# Patient Record
Sex: FEMALE | Race: WHITE
[De-identification: names, ages, dates, MRNs, and addresses within clinical notes are randomized per-mention and may not be internally consistent; named-entity substitution may affect disease eponyms.]

---

## 2022-07-07 ENCOUNTER — APPOINTMENT (OUTPATIENT)
Dept: ORTHOPEDIC SURGERY | Facility: CLINIC | Age: 57
End: 2022-07-07

## 2022-07-07 VITALS — HEIGHT: 63 IN | WEIGHT: 175 LBS | BODY MASS INDEX: 31.01 KG/M2

## 2022-07-07 DIAGNOSIS — E78.00 PURE HYPERCHOLESTEROLEMIA, UNSPECIFIED: ICD-10-CM

## 2022-07-07 DIAGNOSIS — Z78.9 OTHER SPECIFIED HEALTH STATUS: ICD-10-CM

## 2022-07-07 DIAGNOSIS — J45.909 UNSPECIFIED ASTHMA, UNCOMPLICATED: ICD-10-CM

## 2022-07-07 DIAGNOSIS — I10 ESSENTIAL (PRIMARY) HYPERTENSION: ICD-10-CM

## 2022-07-07 DIAGNOSIS — Z80.9 FAMILY HISTORY OF MALIGNANT NEOPLASM, UNSPECIFIED: ICD-10-CM

## 2022-07-07 DIAGNOSIS — E11.9 TYPE 2 DIABETES MELLITUS W/OUT COMPLICATIONS: ICD-10-CM

## 2022-07-07 DIAGNOSIS — Z82.49 FAMILY HISTORY OF ISCHEMIC HEART DISEASE AND OTHER DISEASES OF THE CIRCULATORY SYSTEM: ICD-10-CM

## 2022-07-07 PROCEDURE — 73564 X-RAY EXAM KNEE 4 OR MORE: CPT | Mod: LT

## 2022-07-07 PROCEDURE — 73030 X-RAY EXAM OF SHOULDER: CPT | Mod: RT

## 2022-07-07 PROCEDURE — 99205 OFFICE O/P NEW HI 60 MIN: CPT

## 2022-07-07 PROCEDURE — L3670: CPT

## 2022-07-07 PROCEDURE — L1833: CPT

## 2022-07-08 PROBLEM — Z80.9 FAMILY HISTORY OF MALIGNANT NEOPLASM: Status: ACTIVE | Noted: 2022-07-07

## 2022-07-08 PROBLEM — Z82.49 FAMILY HISTORY OF CARDIAC DISORDER: Status: ACTIVE | Noted: 2022-07-07

## 2022-07-08 PROBLEM — Z78.9 NON-SMOKER: Status: ACTIVE | Noted: 2022-07-07

## 2022-07-08 NOTE — IMAGING
[Right] : right shoulder [Left] : left knee [All Views] : anteroposterior, lateral, skyline, and anteroposterior standing [Fracture] : Fracture [de-identified] : The patient is a well appearing 56 year old F of their stated age.\par Neck is supple & nontender to palpation. Negative Spurling's test.\par \par Effected Shoulder:  right \par Inspection:\par Scapula Winging: Negative\par Deformity: None\par Erythema: None\par Ecchymosis: None\par Abrasions: None\par Effusion: None\par \par Range of Motion:\par Active Forward Flexion: 180 degrees \par Active Abduction: 180 degrees \par Passive Forward Flexion: 180 degrees \par Passive Abduction: 180 degrees \par ER @ 90 degrees: 90 degrees\par IR @ 90 degrees: 45 degrees\par ER @ 0 degrees: 50 degrees\par \par Motor Exam:\par Forward Flexion: 5 out of 5\par Flexion Plane of Scapula: 5 out of 5\par Abduction: 5 out of 5\par Internal Rotation: 5 out of 5\par External Rotation: 5 out of 5\par Distal Motor Strength: 5 out of 5\par Stability Testing:\par Anterior: 1+\par Posterior: 1+\par Sulcus N: 1+\par Sulcus ER: 1+\par \par Provocative Tests:\par Drop Arm: Negative\par Impingement: Negative\par York: Negative\par X-Arm Adduction: Negative\par Belly Press: Negative\par Bear Hug: Negative\par Lift Off: Negative\par Apprehension: Negative\par Relocation: Negative\par Posterior Load & Shift: Negative\par Palpation:\par AC Joint: Nontender\par Clavicle: Nontender\par SC Joint: Nontender\par Bicepital Groove: Nontender\par Coracoid Process: Nontender\par Pectoralis Minor Tendon: Nontender\par Pectoralis Major Tendon: Nontender & palpably intact\par Latissimus Dorsi: Nontender \par Proximal Humerus: Nontender\par Scapula Body: Nontender\par Medial Scapula Boarder: Nontender\par Scapula Spine: Nontender\par \par Neurologic Exam: Sensation to Light Touch:\par Axillary: Grossly intact\par Ulnar: Grossly intact\par Radial: Grossly intact\par Median: Grossly intact\par Other:  N/A\par Circulatory/Pulses:\par Ulnar: 2+\par Radial: 2+\par Other Pertinent Findings: None\par \par Contralateral Shoulder\par Range of Motion:\par Active Forward Flexion: 180 degrees \par Active Abduction: 180 degrees \par Passive Forward Flexion: 180 degrees \par Passive Abduction: 180 degrees \par ER @ 90 degrees: 90 degrees\par IR @ 90 degrees: 45 degrees\par ER @ 0 degrees: 50 degrees\par Motor Exam:\par Forward Flexion: 5 out of 5\par Flexion Plane of Scapula: 5 out of 5\par Abduction: 5 out of 5\par Internal Rotation: 5 out of 5\par External Rotation: 5 out of 5\par Distal Motor Strength: 5 out of 5\par Stability Testing:\par Anterior: 1+\par Posterior: 1+\par Sulcus N: 1+\par Sulcus ER: 1+\par Other Pertinent Findings: None\par \par >>>>>>>>>>>>>>>>>>>>>>>\par \par The patient is a well appearing 56 year old F of their stated age.\par Patient ambulates with a normal gait.\par Negative straight leg raise bilateral\par \par Effected Knee:  left                        	\par ROM:  0-45 degrees\par Lachman: Negative\par Pivot Shift: Negative\par Anterior Drawer: Negative\par Posterior Drawer / Sag:Negative\par Varus Stress 0 degrees: Stable\par Varus Stress 30 degrees: Stable\par Valgus Stress 0 degrees: Stable\par Valgus Stress 30 degrees: Stable\par Medial Bobby: Negative\par Lateral Bobby: Negative\par Patella Glide: 2+\par Patella Apprehension: Negative\par Patella Grind: Negative\par \par Palpation:\par Medial Joint Line: Nontender\par Lateral Joint Line: Nontender\par Medial Collateral Ligament: Nontender\par Lateral Collateral Ligament/PLC: Nontender\par Medial Femoral Condyle: Nontender\par Medial Retinaculum: Nontender\par Distal Femur: Nontender\par Proximal Tibia: Nontender\par Tibial Tubercle: Nontender\par Distal Pole Patella: Nontender\par Quadriceps Tendon: Nontender &  Intact\par Patella Tendon: tender &  Intact\par Medial Distal Hamstring/PES: Nontender\par Lateral Distal Hamstring: Nontender & Stable\par Iliotibial Band: Nontender\par Medial Patellofemoral Ligament: Nontender\par Adductor: Nontender\par Proximal GSC-Plantaris: Nontender\par Calf: Supple & Nontender\par \par Inspection:\par Deformity: No\par Erythema: No\par Ecchymosis: No\par Abrasions: No\par Effusion: No\par Prepatella Bursitis: No\par Neurologic Exam:\par Sensation L4-S1: Grossly Intact\par \par Motor Exam:\par Quadriceps: 5 out of 5\par Hamstrings: 5 out of 5\par EHL: 5 out of 5\par FHL: 5 out of 5\par TA: 5 out of 5\par GS: 5 out of 5\par Circulatory/Pulses:\par Dorsalis Pedis: 2+\par Posterior Tibialis: 2+\par Additional Pertinent Findings: None\par Contralateral Knee:                           	\par ROM: 0-145 degrees\par Other Pertinent Findings: None\par \par Assessment: The patient is a 56 year old F with left knee pain and right shoulder pain and radiographic and physical exam findings consistent with left knee patella fx and right proximal humerus fx\par  \par The patient’s condition is acute\par Documents/Results Reviewed Today: Xray right shoulder scapula, Xray left knee\par Tests/Studies Independently Interpreted Today: Xray right shoulder scapula shows proximal humerus fx -- Xray left knee shows patella fx\par Pertinent findings include: Left knee: 0-45, tender patella -- Right Shoulder: limited by pain \par Confounding medical conditions/concerns: none\par \par Plan: PAtient will be placed in Xrom and Shoulder sling. Discussed activity limitations today. They are referred to Dr. Dennis. \par Tests Ordered: none\par Prescription Medications Ordered: none\par Braces/DME Ordered: Xrom, Ultrasling\par Activity/Work/Sports Status: sales \par Additional Instructions: none\par Follow-Up: prn\par \par The patient's current medication management of their orthopedic diagnosis was reviewed today:\par (1) We discussed a comprehensive treatment plan that included possible pharmaceutical management involving the use of prescription strength medications including but not limited to options such as oral Naprosyn 500mg BID, once daily Meloxicam 15 mg, or 500-650 mg Tylenol versus over the counter oral medications and topical prescription NSAID Pennsaid vs over the counter Voltaren gel.\par (2) There is a moderate risk of morbidity with further treatment, especially from use of prescription strength medications and possible side effects of these medications which include upset stomach with oral medications, skin reactions to topical medications and cardiac/renal issues with long term use.\par (3) I recommended that the patient follow-up with their medical physician to discuss any significant specific potential issues with long term medication use such as interactions with current medications or with exacerbation of underlying medical comorbidities.\par (4) The benefits and risks associated with use of injectable, oral or topical, prescription and over the counter anti-inflammatory medications were discussed with the patient. The patient voiced understanding of the risks including but not limited to bleeding, stroke, kidney dysfunction, heart disease, and were referred to the black box warning label for further information.\par \par I, Sergey Singh, attest that this documentation has been prepared under the direction and in the presence of Provider Dr. Ramirez\par \par The documentation recorded by the scribe accurately reflects the service I personally performed and the decisions made by me.\par \par \par  [FreeTextEntry1] : proximal humerus fx [FreeTextEntry5] : proximal humerus fx [FreeTextEntry9] : patella fx

## 2022-07-08 NOTE — HISTORY OF PRESENT ILLNESS
[de-identified] : The patient is a 56 year old left hand dominant female who presents today complaining of left knee and right arm pain .\par Date of Injury/Onset: 7/4/22\par Pain:    At Rest: 6/10 \par With Activity:  10/10 \par Mechanism of injury: Pt was carrying her granddaughter and tripped and fell, injuring her right arm and left knee, to protect the baby. Pt went to [facility] where they told her she has a fractured humerus and fractured patella\par This is not a Work Related Injury being treated under Worker's Compensation.\par This is not an athletic injury occurring associated with an interscholastic or organized sports team.\par Quality of symptoms: sharp pain\par Improves with: rest, sling for arm, immobilizer for her knee\par Worse with: any movement\par Prior treatment: [facility]\par Prior Imaging: x-ray\par Out of work/sport: currently working\par School/Sport/Position/Occupation: sales\par Additional Information: [None]\par \par

## 2022-07-12 ENCOUNTER — APPOINTMENT (OUTPATIENT)
Dept: ORTHOPEDIC SURGERY | Facility: CLINIC | Age: 57
End: 2022-07-12

## 2022-07-12 VITALS — BODY MASS INDEX: 31.01 KG/M2 | HEIGHT: 63 IN | WEIGHT: 175 LBS

## 2022-07-12 PROCEDURE — 99204 OFFICE O/P NEW MOD 45 MIN: CPT | Mod: 25

## 2022-07-12 PROCEDURE — 27520 TREAT KNEECAP FRACTURE: CPT

## 2022-07-12 RX ORDER — ACETAMINOPHEN AND CODEINE 300; 30 MG/1; MG/1
300-30 TABLET ORAL TWICE DAILY
Qty: 14 | Refills: 0 | Status: ACTIVE | COMMUNITY
Start: 2022-07-12 | End: 1900-01-01

## 2022-07-17 NOTE — IMAGING
[de-identified] : The patient is a well appearing 56 year old F of their stated age.\par Neck is supple & nontender to palpation. Negative Spurling's test.\par \par Effected Shoulder:  right \par Inspection:\par Scapula Winging: Negative\par Deformity: None\par Erythema: None\par Ecchymosis: None\par Abrasions: None\par Effusion: None\par \par Range of Motion:\par Active Forward Flexion: 180 degrees \par Active Abduction: 180 degrees \par Passive Forward Flexion: 180 degrees \par Passive Abduction: 180 degrees \par ER @ 90 degrees: 90 degrees\par IR @ 90 degrees: 45 degrees\par ER @ 0 degrees: 50 degrees\par \par Motor Exam:\par Forward Flexion: 5 out of 5\par Flexion Plane of Scapula: 5 out of 5\par Abduction: 5 out of 5\par Internal Rotation: 5 out of 5\par External Rotation: 5 out of 5\par Distal Motor Strength: 5 out of 5\par Stability Testing:\par Anterior: 1+\par Posterior: 1+\par Sulcus N: 1+\par Sulcus ER: 1+\par \par Provocative Tests:\par Drop Arm: Negative\par Impingement: Negative\par Yancey: Negative\par X-Arm Adduction: Negative\par Belly Press: Negative\par Bear Hug: Negative\par Lift Off: Negative\par Apprehension: Negative\par Relocation: Negative\par Posterior Load & Shift: Negative\par Palpation:\par AC Joint: Nontender\par Clavicle: Nontender\par SC Joint: Nontender\par Bicepital Groove: Nontender\par Coracoid Process: Nontender\par Pectoralis Minor Tendon: Nontender\par Pectoralis Major Tendon: Nontender & palpably intact\par Latissimus Dorsi: Nontender \par Proximal Humerus: Nontender\par Scapula Body: Nontender\par Medial Scapula Boarder: Nontender\par Scapula Spine: Nontender\par \par Neurologic Exam: Sensation to Light Touch:\par Axillary: Grossly intact\par Ulnar: Grossly intact\par Radial: Grossly intact\par Median: Grossly intact\par Other:  N/A\par Circulatory/Pulses:\par Ulnar: 2+\par Radial: 2+\par Other Pertinent Findings: None\par \par Contralateral Shoulder\par Range of Motion:\par Active Forward Flexion: 180 degrees \par Active Abduction: 180 degrees \par Passive Forward Flexion: 180 degrees \par Passive Abduction: 180 degrees \par ER @ 90 degrees: 90 degrees\par IR @ 90 degrees: 45 degrees\par ER @ 0 degrees: 50 degrees\par Motor Exam:\par Forward Flexion: 5 out of 5\par Flexion Plane of Scapula: 5 out of 5\par Abduction: 5 out of 5\par Internal Rotation: 5 out of 5\par External Rotation: 5 out of 5\par Distal Motor Strength: 5 out of 5\par Stability Testing:\par Anterior: 1+\par Posterior: 1+\par Sulcus N: 1+\par Sulcus ER: 1+\par Other Pertinent Findings: None\par \par >>>>>>>>>>>>>>>>>>>>>>>\par \par The patient is a well appearing 56 year old F of their stated age.\par Patient ambulates with a normal gait.\par Negative straight leg raise bilateral\par \par Effected Knee:  left                        	\par ROM:  0-45 degrees\par Lachman: Negative\par Pivot Shift: Negative\par Anterior Drawer: Negative\par Posterior Drawer / Sag:Negative\par Varus Stress 0 degrees: Stable\par Varus Stress 30 degrees: Stable\par Valgus Stress 0 degrees: Stable\par Valgus Stress 30 degrees: Stable\par Medial Bobby: Negative\par Lateral Bobby: Negative\par Patella Glide: 2+\par Patella Apprehension: Negative\par Patella Grind: Negative\par \par Palpation:\par Medial Joint Line: Nontender\par Lateral Joint Line: Nontender\par Medial Collateral Ligament: Nontender\par Lateral Collateral Ligament/PLC: Nontender\par Medial Femoral Condyle: Nontender\par Medial Retinaculum: Nontender\par Distal Femur: Nontender\par Proximal Tibia: Nontender\par Tibial Tubercle: Nontender\par Distal Pole Patella: Nontender\par Quadriceps Tendon: Nontender &  Intact\par Patella Tendon: tender &  Intact\par Medial Distal Hamstring/PES: Nontender\par Lateral Distal Hamstring: Nontender & Stable\par Iliotibial Band: Nontender\par Medial Patellofemoral Ligament: Nontender\par Adductor: Nontender\par Proximal GSC-Plantaris: Nontender\par Calf: Supple & Nontender\par \par Inspection:\par Deformity: No\par Erythema: No\par Ecchymosis: No\par Abrasions: No\par Effusion: No\par Prepatella Bursitis: No\par Neurologic Exam:\par Sensation L4-S1: Grossly Intact\par \par Motor Exam:\par Quadriceps: 5 out of 5\par Hamstrings: 5 out of 5\par EHL: 5 out of 5\par FHL: 5 out of 5\par TA: 5 out of 5\par GS: 5 out of 5\par Circulatory/Pulses:\par Dorsalis Pedis: 2+\par Posterior Tibialis: 2+\par Additional Pertinent Findings: None\par Contralateral Knee:                           	\par ROM: 0-145 degrees\par Other Pertinent Findings: None\par \par Assessment: The patient is a 56 year old F with left knee pain and right shoulder pain and radiographic and physical exam findings consistent with left knee patella fx and right proximal humerus fx\par  \par The patient’s condition is acute\par Documents/Results Reviewed Today: Xray right shoulder scapula, Xray left knee\par Tests/Studies Independently Interpreted Today: Xray right shoulder scapula shows proximal humerus fx -- Xray left knee shows patella fx\par Pertinent findings include: Left knee: 0-45, tender patella -- Right Shoulder: limited by pain \par Confounding medical conditions/concerns: none\par \par Plan: PAtient will be placed in Xrom and Shoulder sling. Discussed activity limitations today. They are referred to Dr. Dennis. \par Tests Ordered: none\par Prescription Medications Ordered: none\par Braces/DME Ordered: Xrom, Ultrasling\par Activity/Work/Sports Status: sales \par Additional Instructions: none\par Follow-Up: prn\par \par The patient's current medication management of their orthopedic diagnosis was reviewed today:\par (1) We discussed a comprehensive treatment plan that included possible pharmaceutical management involving the use of prescription strength medications including but not limited to options such as oral Naprosyn 500mg BID, once daily Meloxicam 15 mg, or 500-650 mg Tylenol versus over the counter oral medications and topical prescription NSAID Pennsaid vs over the counter Voltaren gel.\par (2) There is a moderate risk of morbidity with further treatment, especially from use of prescription strength medications and possible side effects of these medications which include upset stomach with oral medications, skin reactions to topical medications and cardiac/renal issues with long term use.\par (3) I recommended that the patient follow-up with their medical physician to discuss any significant specific potential issues with long term medication use such as interactions with current medications or with exacerbation of underlying medical comorbidities.\par (4) The benefits and risks associated with use of injectable, oral or topical, prescription and over the counter anti-inflammatory medications were discussed with the patient. The patient voiced understanding of the risks including but not limited to bleeding, stroke, kidney dysfunction, heart disease, and were referred to the black box warning label for further information.\par \par I, Sergey Singh, attest that this documentation has been prepared under the direction and in the presence of Provider Dr. Ramirez\par \par The documentation recorded by the scribe accurately reflects the service I personally performed and the decisions made by me.\par \par \par  [Right] : right shoulder [Left] : left knee [All Views] : anteroposterior, lateral, skyline, and anteroposterior standing [Fracture] : Fracture [FreeTextEntry1] : proximal humerus fx [FreeTextEntry5] : proximal humerus fx [FreeTextEntry9] : patella fx

## 2022-07-17 NOTE — REASON FOR VISIT
[Spouse] : spouse [FreeTextEntry2] : Libby is here today as a new patient for left knee pain and right shoulder pain.

## 2022-07-17 NOTE — HISTORY OF PRESENT ILLNESS
[de-identified] : The patient is a 56 year old left hand dominant female who presents today complaining of left knee and right arm pain.\par Date of Injury/Onset: 7/4/22\par Pain: At Rest: 6/10 \par With Activity: 10/10 \par Mechanism of injury: Pt was carrying her granddaughter and tripped and fell, injuring her right arm and left knee, to protect the baby. Pt went to [facility] where they told her she has a fractured humerus and fractured patella\par This is not a Work Related Injury being treated under Worker's Compensation.\par This is not an athletic injury occurring associated with an interscholastic or organized sports team.\par Quality of symptoms: sharp pain\par Improves with: rest, sling for arm, immobilizer for her knee\par Worse with: any movement\par Prior treatment: [facility]\par Prior Imaging: x-ray\par Out of work/sport: currently working\par School/Sport/Position/Occupation: sales\par Additional Information: [None]\par

## 2022-07-17 NOTE — DISCUSSION/SUMMARY
[de-identified] : Xrays reviewed\par natural history discussed\par Non operative vs operative treatment discussed\par can \par PT knee PROM only

## 2022-07-17 NOTE — ASSESSMENT
[FreeTextEntry1] : Patient and I discussed their issues. The left patella fracture is nondisplaced. The right proximal humerus fracture is a Neer 2-part surgical neck. Discussed findings of today's exam and possible causes of patient's pain. Educated patient on their most probable diagnosis of nondisplaced patella fracture and 2-part Neer proximal humerus fracture. Reviewed possible courses of treatment, and we collaboratively decided best course of treatment at this time will include:\par \par 1. Nonoperative management for left patella fracture\par 2. CT of right shoulder to eval for intra articular extension\par 3. Likely nonop management as well\par \par Instructions: Dx / Natural History\par The patient was advised of the diagnosis.  The natural history of the pathology was explained in full to the patient in layman's terms.  Several different treatment options were discussed and explained in full to the patient including the risks and benefits of both surgical and non-surgical treatments.  All questions and concerns were answered. \par \par RICE\par I explained to the patient that rest, ice, compression, and elevation would benefit them.  They may return to activity after follow-up or when they no longer have any pain.\par \par NSAIDs - OTC\par Patient is to begin over the counter oral anti-inflammatory medications on an as needed basis, as long as there are no medical contraindications.  Patient is counseled on possible GI and blood pressure side effects.\par \par Pain Guide Activities\par The patient was advised to let pain guide the gradual advancement of activities.\par \par Icing\par The patient was advised to apply ice (wrapped in a towel or protective covering) to the area daily (20 minutes at a time, 2-4X/day).\par \par Follow up after CT.
Male

## 2022-07-19 ENCOUNTER — FORM ENCOUNTER (OUTPATIENT)
Age: 57
End: 2022-07-19

## 2022-07-20 ENCOUNTER — APPOINTMENT (OUTPATIENT)
Dept: CT IMAGING | Facility: CLINIC | Age: 57
End: 2022-07-20

## 2022-07-20 PROCEDURE — 76376 3D RENDER W/INTRP POSTPROCES: CPT | Mod: RT

## 2022-07-20 PROCEDURE — 73200 CT UPPER EXTREMITY W/O DYE: CPT | Mod: RT

## 2022-07-25 ENCOUNTER — APPOINTMENT (OUTPATIENT)
Dept: ORTHOPEDIC SURGERY | Facility: CLINIC | Age: 57
End: 2022-07-25

## 2022-07-25 VITALS — BODY MASS INDEX: 31.01 KG/M2 | HEIGHT: 63 IN | WEIGHT: 175 LBS

## 2022-07-25 DIAGNOSIS — S82.045A NONDISPLACED COMMINUTED FRACTURE OF LEFT PATELLA, INITIAL ENCOUNTER FOR CLOSED FRACTURE: ICD-10-CM

## 2022-07-25 DIAGNOSIS — S42.294A OTHER NONDISPLACED FRACTURE OF UPPER END OF RIGHT HUMERUS, INITIAL ENCOUNTER FOR CLOSED FRACTURE: ICD-10-CM

## 2022-07-25 PROCEDURE — 99214 OFFICE O/P EST MOD 30 MIN: CPT | Mod: 25

## 2022-07-25 PROCEDURE — 73030 X-RAY EXAM OF SHOULDER: CPT | Mod: RT

## 2022-07-25 PROCEDURE — 23600 CLTX PROX HUMRL FX W/O MNPJ: CPT

## 2022-07-25 PROCEDURE — 27520 TREAT KNEECAP FRACTURE: CPT

## 2022-07-25 NOTE — IMAGING
[Right] : right shoulder [Left] : left knee [All Views] : anteroposterior, lateral, skyline, and anteroposterior standing [Fracture] : Fracture [FreeTextEntry1] : proximal humerus fx [FreeTextEntry5] : proximal humerus fx [FreeTextEntry9] : patella fx [de-identified] : The patient is a well appearing 56 year old F of their stated age.\par Neck is supple & nontender to palpation. Negative Spurling's test.\par \par Effected Shoulder:  right \par Inspection:\par Scapula Winging: Negative\par Deformity: None\par Erythema: None\par Ecchymosis: None\par Abrasions: None\par Effusion: None\par \par Range of Motion:\par Active Forward Flexion: 180 degrees \par Active Abduction: 180 degrees \par Passive Forward Flexion: 180 degrees \par Passive Abduction: 180 degrees \par ER @ 90 degrees: 90 degrees\par IR @ 90 degrees: 45 degrees\par ER @ 0 degrees: 50 degrees\par \par Motor Exam:\par Forward Flexion: 5 out of 5\par Flexion Plane of Scapula: 5 out of 5\par Abduction: 5 out of 5\par Internal Rotation: 5 out of 5\par External Rotation: 5 out of 5\par Distal Motor Strength: 5 out of 5\par Stability Testing:\par Anterior: 1+\par Posterior: 1+\par Sulcus N: 1+\par Sulcus ER: 1+\par \par Provocative Tests:\par Drop Arm: Negative\par Impingement: Negative\par Gillespie: Negative\par X-Arm Adduction: Negative\par Belly Press: Negative\par Bear Hug: Negative\par Lift Off: Negative\par Apprehension: Negative\par Relocation: Negative\par Posterior Load & Shift: Negative\par Palpation:\par AC Joint: Nontender\par Clavicle: Nontender\par SC Joint: Nontender\par Bicepital Groove: Nontender\par Coracoid Process: Nontender\par Pectoralis Minor Tendon: Nontender\par Pectoralis Major Tendon: Nontender & palpably intact\par Latissimus Dorsi: Nontender \par Proximal Humerus: Nontender\par Scapula Body: Nontender\par Medial Scapula Boarder: Nontender\par Scapula Spine: Nontender\par \par Neurologic Exam: Sensation to Light Touch:\par Axillary: Grossly intact\par Ulnar: Grossly intact\par Radial: Grossly intact\par Median: Grossly intact\par Other:  N/A\par Circulatory/Pulses:\par Ulnar: 2+\par Radial: 2+\par Other Pertinent Findings: None\par \par Contralateral Shoulder\par Range of Motion:\par Active Forward Flexion: 180 degrees \par Active Abduction: 180 degrees \par Passive Forward Flexion: 180 degrees \par Passive Abduction: 180 degrees \par ER @ 90 degrees: 90 degrees\par IR @ 90 degrees: 45 degrees\par ER @ 0 degrees: 50 degrees\par Motor Exam:\par Forward Flexion: 5 out of 5\par Flexion Plane of Scapula: 5 out of 5\par Abduction: 5 out of 5\par Internal Rotation: 5 out of 5\par External Rotation: 5 out of 5\par Distal Motor Strength: 5 out of 5\par Stability Testing:\par Anterior: 1+\par Posterior: 1+\par Sulcus N: 1+\par Sulcus ER: 1+\par Other Pertinent Findings: None\par \par >>>>>>>>>>>>>>>>>>>>>>>\par \par The patient is a well appearing 56 year old F of their stated age.\par Patient ambulates with a normal gait.\par Negative straight leg raise bilateral\par \par Effected Knee:  left                        	\par ROM:  0-45 degrees\par Lachman: Negative\par Pivot Shift: Negative\par Anterior Drawer: Negative\par Posterior Drawer / Sag:Negative\par Varus Stress 0 degrees: Stable\par Varus Stress 30 degrees: Stable\par Valgus Stress 0 degrees: Stable\par Valgus Stress 30 degrees: Stable\par Medial Bobby: Negative\par Lateral Bobby: Negative\par Patella Glide: 2+\par Patella Apprehension: Negative\par Patella Grind: Negative\par \par Palpation:\par Medial Joint Line: Nontender\par Lateral Joint Line: Nontender\par Medial Collateral Ligament: Nontender\par Lateral Collateral Ligament/PLC: Nontender\par Medial Femoral Condyle: Nontender\par Medial Retinaculum: Nontender\par Distal Femur: Nontender\par Proximal Tibia: Nontender\par Tibial Tubercle: Nontender\par Distal Pole Patella: Nontender\par Quadriceps Tendon: Nontender &  Intact\par Patella Tendon: tender &  Intact\par Medial Distal Hamstring/PES: Nontender\par Lateral Distal Hamstring: Nontender & Stable\par Iliotibial Band: Nontender\par Medial Patellofemoral Ligament: Nontender\par Adductor: Nontender\par Proximal GSC-Plantaris: Nontender\par Calf: Supple & Nontender\par \par Inspection:\par Deformity: No\par Erythema: No\par Ecchymosis: No\par Abrasions: No\par Effusion: No\par Prepatella Bursitis: No\par Neurologic Exam:\par Sensation L4-S1: Grossly Intact\par \par Motor Exam:\par Quadriceps: 5 out of 5\par Hamstrings: 5 out of 5\par EHL: 5 out of 5\par FHL: 5 out of 5\par TA: 5 out of 5\par GS: 5 out of 5\par Circulatory/Pulses:\par Dorsalis Pedis: 2+\par Posterior Tibialis: 2+\par Additional Pertinent Findings: None\par Contralateral Knee:                           	\par ROM: 0-145 degrees\par Other Pertinent Findings: None\par \par Assessment: The patient is a 56 year old F with left knee pain and right shoulder pain and radiographic and physical exam findings consistent with left knee patella fx and right proximal humerus fx\par  \par The patient’s condition is acute\par Documents/Results Reviewed Today: Xray right shoulder scapula, Xray left knee\par Tests/Studies Independently Interpreted Today: Xray right shoulder scapula shows proximal humerus fx -- Xray left knee shows patella fx\par Pertinent findings include: Left knee: 0-45, tender patella -- Right Shoulder: limited by pain \par Confounding medical conditions/concerns: none\par \par Plan: PAtient will be placed in Xrom and Shoulder sling. Discussed activity limitations today. They are referred to Dr. Dennis. \par Tests Ordered: none\par Prescription Medications Ordered: none\par Braces/DME Ordered: Xrom, Ultrasling\par Activity/Work/Sports Status: sales \par Additional Instructions: none\par Follow-Up: prn\par \par The patient's current medication management of their orthopedic diagnosis was reviewed today:\par (1) We discussed a comprehensive treatment plan that included possible pharmaceutical management involving the use of prescription strength medications including but not limited to options such as oral Naprosyn 500mg BID, once daily Meloxicam 15 mg, or 500-650 mg Tylenol versus over the counter oral medications and topical prescription NSAID Pennsaid vs over the counter Voltaren gel.\par (2) There is a moderate risk of morbidity with further treatment, especially from use of prescription strength medications and possible side effects of these medications which include upset stomach with oral medications, skin reactions to topical medications and cardiac/renal issues with long term use.\par (3) I recommended that the patient follow-up with their medical physician to discuss any significant specific potential issues with long term medication use such as interactions with current medications or with exacerbation of underlying medical comorbidities.\par (4) The benefits and risks associated with use of injectable, oral or topical, prescription and over the counter anti-inflammatory medications were discussed with the patient. The patient voiced understanding of the risks including but not limited to bleeding, stroke, kidney dysfunction, heart disease, and were referred to the black box warning label for further information.\par \par I, Sergey Singh, attest that this documentation has been prepared under the direction and in the presence of Provider Dr. Ramirez\par \par The documentation recorded by the scribe accurately reflects the service I personally performed and the decisions made by me.\par \par \par

## 2022-08-01 NOTE — ASSESSMENT
[FreeTextEntry1] : Begin PT for shoulder/ continue knee PT\par fu 3 weeks - repeat xrays - may remove knee brace and shoulder sling

## 2022-08-01 NOTE — HISTORY OF PRESENT ILLNESS
[de-identified] : The patient is a 56 year old left hand dominant female who presents today complaining of left knee and right arm pain.\par Date of Injury/Onset: 7/4/22\par Pain: At Rest: 1/10 \par With Activity: 6/10 \par Mechanism of injury: Pt was carrying her granddaughter and tripped and fell, injuring her right arm and left knee, to protect the baby. Pt went to [facility] where they told her she has a fractured humerus and fractured patella\par This is not a Work Related Injury being treated under Worker's Compensation.\par This is not an athletic injury occurring associated with an interscholastic or organized sports team.\par Quality of symptoms: sharp pain\par Improves with: rest, sling for arm, immobilizer for her knee\par Worse with: any movement\par Treatment: CT \par Treatment/Imaging/Studies Since Last Visit: CT for right arn & PT for knee\par 	Reports Available For Review Today: \par Out of work/sport: currently working\par School/Sport/Position/Occupation: sales\par Additional Information: [None]\par

## 2022-08-01 NOTE — DATA REVIEWED
[CT Scan] : CT scan [Right] : of the right [Shoulder] : shoulder [Report was reviewed and noted in the chart] : The report was reviewed and noted in the chart [I independently reviewed and interpreted images and report] : I independently reviewed and interpreted images and report [FreeTextEntry1] : 1. Complex comminuted fracture of the proximal humerus with 1 cm of overlapping in the region of the neck \par with anatomic alignment at the glenohumeral joint, however, there are displaced bone fragments anteriorly and \par laterally in the region of the humeral neck as well as effusion and surrounding soft tissue swelling without CT \par evidence of loose body. There may be a nondisplaced fracture of the posterior inferior glenoid.\par 2. Mild right AC joint arthrosis.\par 3. Correlation with plain film and physical exam is recommended.\par 4.Consider MRI of the right shoulder to further evaluate.

## 2022-08-01 NOTE — REASON FOR VISIT
[Spouse] : spouse [FreeTextEntry2] : Patient is here today for a followup visit for left knee patella fracture, and right proximal humerus fracture.

## 2022-08-01 NOTE — DISCUSSION/SUMMARY
[de-identified] : I have personally reviewed all previous images as well as imaging reports.  I have reviewed any previous medical records including physical therapy reports, and reports from referring physicians.\par \par The patient was explained the diagnosis and treatment options for the condition in great detail.  We discussed the use of injectable as well as oral medications and steroids, physical therapy, oral and topical anti-inflammatories other modalities including surgical treatment.  \par \par After reviewing all clinical information provided as well as the available imaging studies, I recommended activity modification and avoidance conditions leading to pain.  We discussed the possibility of prescription strength anti-inflammatory medications as well as use of over-the-counter medications.  We also discussed surgical options to treat this problem.\par  \par \par Instructions: Dx / Natural History\par The patient was advised of the diagnosis.  The natural history of the pathology was explained in full to the patient in layman's terms.  Several different treatment options were discussed and explained in full to the patient including the risks and benefits of both surgical and non-surgical treatments.  All questions and concerns were answered. \par \par RICE\par I explained to the patient that rest, ice, compression, and elevation would benefit them.  They may return to activity after follow-up or when they no longer have any pain.\par \par NSAIDs - OTC\par Patient is to begin over the counter oral anti-inflammatory medications on an as needed basis, as long as there are no medical contraindications.  Patient is counseled on possible GI and blood pressure side effects.\par \par Pain Guide Activities\par The patient was advised to let pain guide the gradual advancement of activities.\par \par Icing\par The patient was advised to apply ice (wrapped in a towel or protective covering) to the area daily (20 minutes at a time, 2-4X/day).

## 2022-08-01 NOTE — PHYSICAL EXAM
[Normal Coordination] : normal coordination [Normal DTR UE/LE] : normal DTR UE/LE  [Normal Mood and Affect] : normal mood and affect [Normal Sensation] : normal sensation [Orientated] : orientated [Normal Skin] : normal skin [No Rash] : no rash [No Ulcers] : no ulcers [No Lesions] : no lesions [No obvious lymphadenopathy in areas examined] : no obvious lymphadenopathy in areas examined [Right] : right shoulder [The fracture is in acceptable alignment. There is progression in healing seen] : The fracture is in acceptable alignment. There is progression in healing seen [Left] : left knee [] : light touch is intact throughout

## 2022-08-15 ENCOUNTER — APPOINTMENT (OUTPATIENT)
Dept: ORTHOPEDIC SURGERY | Facility: CLINIC | Age: 57
End: 2022-08-15

## 2022-08-15 VITALS — HEIGHT: 63 IN | WEIGHT: 175 LBS | BODY MASS INDEX: 31.01 KG/M2

## 2022-08-15 PROCEDURE — 73564 X-RAY EXAM KNEE 4 OR MORE: CPT | Mod: LT

## 2022-08-15 PROCEDURE — 73030 X-RAY EXAM OF SHOULDER: CPT | Mod: RT

## 2022-08-15 RX ORDER — DICLOFENAC SODIUM 1% 10 MG/G
1 GEL TOPICAL DAILY
Qty: 1 | Refills: 0 | Status: ACTIVE | COMMUNITY
Start: 2022-08-15 | End: 1900-01-01

## 2022-08-15 NOTE — HISTORY OF PRESENT ILLNESS
[de-identified] : The patient is a 56 year old left hand dominant female who presents today complaining of left knee and right arm pain.\par Date of Injury/Onset: 7/4/22\par Pain: At Rest: 1/10 \par With Activity: 6/10 \par Mechanism of injury: Pt was carrying her granddaughter and tripped and fell, injuring her right arm and left knee, to protect the baby. Pt went to [facility] where they told her she has a fractured humerus and fractured patella\par This is not a Work Related Injury being treated under Worker's Compensation.\par This is not an athletic injury occurring associated with an interscholastic or organized sports team.\par Quality of symptoms: sharp pain\par Improves with: rest, sling for arm, immobilizer for her knee\par Worse with: any movement\par Treatment: PT \par Treatment/Imaging/Studies Since Last Visit: PT\par 	Reports Available For Review Today: \par Out of work/sport: currently working\par School/Sport/Position/Occupation: sales\par Additional Information: [None]\par \par Patient is here today for a followup visit for RT humerus fx and LT patella fx. Pt using shoulder sling. Pt states pain has been improving, no weakness or tingling. Accompanied by .

## 2022-08-15 NOTE — DISCUSSION/SUMMARY
[de-identified] : Patient and I discussed their symptoms, RT upper arm pain and LT knee pain . Discussed findings of today's exam and possible causes of patient's pain. Educated patient on their most probable diagnosis of RT humerus fracture and LT patella fracture. Reviewed possible courses of treatment, and we collaboratively decided best course of treatment at this time will include:\par \par 1. Naproxen\par 2. Topical diclofenac\par 3. May increase function as tolerated\par \par Instructions: Dx / Natural History\par The patient was advised of the diagnosis.  The natural history of the pathology was explained in full to the patient in layman's terms.  Several different treatment options were discussed and explained in full to the patient including the risks and benefits of both surgical and non-surgical treatments.  All questions and concerns were answered. \par \par RICE\par I explained to the patient that rest, ice, compression, and elevation would benefit them.  They may return to activity after follow-up or when they no longer have any pain.\par \par NSAIDs - OTC\par Patient is to begin over the counter oral anti-inflammatory medications on an as needed basis, as long as there are no medical contraindications.  Patient is counseled on possible GI and blood pressure side effects.\par \par Pain Guide Activities\par The patient was advised to let pain guide the gradual advancement of activities.\par \par Icing\par The patient was advised to apply ice (wrapped in a towel or protective covering) to the area daily (20 minutes at a time, 2-4X/day).\par \par Follow up in 6 weeks.\par \par All of the patient's questions were answered to Her satisfaction. Diagnoses and potential treatments were reviewed. She agreed with the plan and would like to move forward with it. \par \par Giovanni De L aCruz acting as scribe.

## 2022-08-15 NOTE — PHYSICAL EXAM
[Normal Coordination] : normal coordination [Normal DTR UE/LE] : normal DTR UE/LE  [Normal Sensation] : normal sensation [Normal Mood and Affect] : normal mood and affect [Orientated] : orientated [Normal Skin] : normal skin [No Rash] : no rash [No Ulcers] : no ulcers [No Lesions] : no lesions [No obvious lymphadenopathy in areas examined] : no obvious lymphadenopathy in areas examined [Right] : right shoulder [The fracture is in acceptable alignment. There is progression in healing seen] : The fracture is in acceptable alignment. There is progression in healing seen [Left] : left knee [] : light touch is intact throughout

## 2022-08-15 NOTE — IMAGING
[Right] : right shoulder [Left] : left knee [All Views] : anteroposterior, lateral, skyline, and anteroposterior standing [The fracture is in acceptable alignment. There is progression in healing seen] : The fracture is in acceptable alignment. There is progression in healing seen

## 2022-09-14 ENCOUNTER — RX RENEWAL (OUTPATIENT)
Age: 57
End: 2022-09-14

## 2022-09-14 RX ORDER — NAPROXEN 500 MG/1
500 TABLET ORAL
Qty: 60 | Refills: 0 | Status: ACTIVE | COMMUNITY
Start: 2022-08-15 | End: 1900-01-01

## 2022-09-23 DIAGNOSIS — S42.279D: ICD-10-CM

## 2022-10-10 ENCOUNTER — APPOINTMENT (OUTPATIENT)
Dept: ORTHOPEDIC SURGERY | Facility: CLINIC | Age: 57
End: 2022-10-10

## 2022-10-10 VITALS — WEIGHT: 175 LBS | BODY MASS INDEX: 31.01 KG/M2 | HEIGHT: 63 IN

## 2022-10-10 DIAGNOSIS — S82.002D UNSPECIFIED FRACTURE OF LEFT PATELLA, SUBSEQUENT ENCOUNTER FOR CLOSED FRACTURE WITH ROUTINE HEALING: ICD-10-CM

## 2022-10-10 DIAGNOSIS — Z00.00 ENCOUNTER FOR GENERAL ADULT MEDICAL EXAMINATION W/OUT ABNORMAL FINDINGS: ICD-10-CM

## 2022-10-10 DIAGNOSIS — S42.201D UNSPECIFIED FRACTURE OF UPPER END OF RIGHT HUMERUS, SUBSEQUENT ENCOUNTER FOR FRACTURE WITH ROUTINE HEALING: ICD-10-CM

## 2022-10-10 PROCEDURE — 73030 X-RAY EXAM OF SHOULDER: CPT | Mod: RT

## 2022-10-10 PROCEDURE — 73564 X-RAY EXAM KNEE 4 OR MORE: CPT | Mod: LT

## 2022-10-10 PROCEDURE — 99213 OFFICE O/P EST LOW 20 MIN: CPT | Mod: 24

## 2022-10-17 PROBLEM — S82.002D CLOSED NONDISPLACED FRACTURE OF LEFT PATELLA WITH ROUTINE HEALING, UNSPECIFIED FRACTURE MORPHOLOGY, SUBSEQUENT ENCOUNTER: Status: ACTIVE | Noted: 2022-08-15

## 2022-10-17 PROBLEM — Z00.00 ENCOUNTER FOR PREVENTIVE HEALTH EXAMINATION: Status: ACTIVE | Noted: 2022-07-06

## 2022-10-17 NOTE — DISCUSSION/SUMMARY
[de-identified] : Patient and I discussed their symptoms, RT upper arm pain and LT knee pain . Discussed findings of today's exam and possible causes of patient's pain. Educated patient on their most probable diagnosis of RT humerus fracture and LT patella fracture. Reviewed possible courses of treatment, and we collaboratively decided best course of treatment at this time will include:\par \par 1. Naproxen\par 2. Topical diclofenac\par 3. May increase function as tolerated\par \par Instructions: Dx / Natural History\par The patient was advised of the diagnosis.  The natural history of the pathology was explained in full to the patient in layman's terms.  Several different treatment options were discussed and explained in full to the patient including the risks and benefits of both surgical and non-surgical treatments.  All questions and concerns were answered. \par \par RICE\par I explained to the patient that rest, ice, compression, and elevation would benefit them.  They may return to activity after follow-up or when they no longer have any pain.\par \par NSAIDs - OTC\par Patient is to begin over the counter oral anti-inflammatory medications on an as needed basis, as long as there are no medical contraindications.  Patient is counseled on possible GI and blood pressure side effects.\par \par Pain Guide Activities\par The patient was advised to let pain guide the gradual advancement of activities.\par \par Icing\par The patient was advised to apply ice (wrapped in a towel or protective covering) to the area daily (20 minutes at a time, 2-4X/day).\par \par Follow up in 6 months\par \par All of the patient's questions were answered to Her satisfaction. Diagnoses and potential treatments were reviewed. She agreed with the plan and would like to move forward with it.

## 2022-10-17 NOTE — HISTORY OF PRESENT ILLNESS
[de-identified] : The patient is a 57 year old left hand dominant female who presents today complaining of left knee and right arm pain.\par Date of Injury/Onset: 7/4/22\par Pain: At Rest:  0/10 \par With Activity: 1-2/10 \par Mechanism of injury: Pt was carrying her granddaughter and tripped and fell, injuring her right arm and left knee, to protect the baby. Pt went to [facility] where they told her she has a fractured humerus and fractured patella\par This is not a Work Related Injury being treated under Worker's Compensation.\par This is not an athletic injury occurring associated with an interscholastic or organized sports team.\par Quality of symptoms: sharp pain\par Improves with: rest, sling for arm, immobilizer for her knee\par Worse with: any movement\par Treatment/Imaging/Studies Since Last Visit: PT\par 	Reports Available For Review Today: \par Out of work/sport: currently working\par School/Sport/Position/Occupation: sales\par Additional Information: [None]\par

## 2024-02-01 ENCOUNTER — NON-APPOINTMENT (OUTPATIENT)
Age: 59
End: 2024-02-01